# Patient Record
Sex: FEMALE | Race: OTHER | Employment: UNEMPLOYED | URBAN - METROPOLITAN AREA
[De-identification: names, ages, dates, MRNs, and addresses within clinical notes are randomized per-mention and may not be internally consistent; named-entity substitution may affect disease eponyms.]

---

## 2018-06-05 PROCEDURE — 86618 LYME DISEASE ANTIBODY: CPT | Performed by: OTHER

## 2018-06-05 PROCEDURE — 82746 ASSAY OF FOLIC ACID SERUM: CPT | Performed by: OTHER

## 2018-06-05 PROCEDURE — 82607 VITAMIN B-12: CPT | Performed by: OTHER

## 2018-06-05 PROCEDURE — 36415 COLL VENOUS BLD VENIPUNCTURE: CPT | Performed by: OTHER

## 2018-06-05 PROCEDURE — 82164 ANGIOTENSIN I ENZYME TEST: CPT | Performed by: OTHER

## 2018-06-08 PROBLEM — M51.26 L4-L5 DISC BULGE: Status: ACTIVE | Noted: 2018-06-08

## 2018-06-08 PROBLEM — M54.16 LUMBAR RADICULOPATHY: Status: ACTIVE | Noted: 2018-06-08

## 2018-06-08 PROBLEM — R20.0 LEG NUMBNESS: Status: ACTIVE | Noted: 2018-06-08

## 2018-06-18 NOTE — IMAGING NOTE
ATTEMPTED TO PHONE PT PRE LUMBAR PUNCTURE, AND REVIEW MEDS AND ALLERGIES. OF CONCERN, PT MED LIST INCLUDES IBUPROFEN (SHOULD BE OFF MED X 5 DAYS AND THE LP IS SCHEDULED FOR 06/22. ALSO OF CONCERN, PT IS SCHEDULED FOR LATE IN DAY PROCEDURE).  AWAITING CALL B

## 2018-06-19 NOTE — IMAGING NOTE
SPOKE WITH PATIENT AND DISCUSSED PREP FOR PROCEDURE.  CHANGE ARRIVAL TIME TO 1345 RATHER THEN 1430 PER RN

## 2018-06-20 ENCOUNTER — HOSPITAL ENCOUNTER (OUTPATIENT)
Dept: GENERAL RADIOLOGY | Facility: HOSPITAL | Age: 48
Discharge: HOME | End: 2018-06-20
Attending: Other
Payer: COMMERCIAL

## 2018-06-20 VITALS — DIASTOLIC BLOOD PRESSURE: 65 MMHG | HEART RATE: 65 BPM | SYSTOLIC BLOOD PRESSURE: 108 MMHG | RESPIRATION RATE: 16 BRPM

## 2018-06-20 DIAGNOSIS — R20.0 NUMBNESS AND TINGLING: ICD-10-CM

## 2018-06-20 DIAGNOSIS — R20.2 NUMBNESS AND TINGLING: ICD-10-CM

## 2018-06-20 DIAGNOSIS — R90.89 ABNORMAL FINDING ON MRI OF BRAIN: ICD-10-CM

## 2018-06-20 PROCEDURE — 86617 LYME DISEASE ANTIBODY: CPT

## 2018-06-20 PROCEDURE — 62270 DX LMBR SPI PNXR: CPT | Performed by: OTHER

## 2018-06-20 PROCEDURE — 84157 ASSAY OF PROTEIN OTHER: CPT

## 2018-06-20 PROCEDURE — 82784 ASSAY IGA/IGD/IGG/IGM EACH: CPT

## 2018-06-20 PROCEDURE — 82040 ASSAY OF SERUM ALBUMIN: CPT

## 2018-06-20 PROCEDURE — 36415 COLL VENOUS BLD VENIPUNCTURE: CPT

## 2018-06-20 PROCEDURE — 77003 FLUOROGUIDE FOR SPINE INJECT: CPT | Performed by: OTHER

## 2018-06-20 PROCEDURE — 89051 BODY FLUID CELL COUNT: CPT

## 2018-06-20 PROCEDURE — 87798 DETECT AGENT NOS DNA AMP: CPT

## 2018-06-20 PROCEDURE — 84165 PROTEIN E-PHORESIS SERUM: CPT

## 2018-06-20 PROCEDURE — 89050 BODY FLUID CELL COUNT: CPT

## 2018-06-20 PROCEDURE — 86592 SYPHILIS TEST NON-TREP QUAL: CPT

## 2018-06-20 PROCEDURE — 82164 ANGIOTENSIN I ENZYME TEST: CPT

## 2018-06-20 PROCEDURE — 82042 OTHER SOURCE ALBUMIN QUAN EA: CPT

## 2018-06-20 PROCEDURE — 83916 OLIGOCLONAL BANDS: CPT

## 2018-06-20 PROCEDURE — 82945 GLUCOSE OTHER FLUID: CPT

## 2018-06-20 RX ORDER — LIDOCAINE HYDROCHLORIDE 10 MG/ML
5 INJECTION, SOLUTION EPIDURAL; INFILTRATION; INTRACAUDAL; PERINEURAL ONCE
Status: COMPLETED | OUTPATIENT
Start: 2018-06-20 | End: 2018-06-20

## 2018-06-20 RX ORDER — LIDOCAINE HYDROCHLORIDE 10 MG/ML
INJECTION, SOLUTION EPIDURAL; INFILTRATION; INTRACAUDAL; PERINEURAL
Status: COMPLETED
Start: 2018-06-20 | End: 2018-06-20

## 2018-06-20 RX ADMIN — LIDOCAINE HYDROCHLORIDE 5 ML: 10 INJECTION, SOLUTION EPIDURAL; INFILTRATION; INTRACAUDAL; PERINEURAL at 15:59:00

## 2018-06-20 RX ADMIN — LIDOCAINE HYDROCHLORIDE 5 ML: 10 INJECTION, SOLUTION EPIDURAL; INFILTRATION; INTRACAUDAL; PERINEURAL at 14:15:00

## 2018-06-20 NOTE — IMAGING NOTE
1420:NURSING ASSESSMENT PRIOR TO LP. VITAL STABLE SEE FLOW SHEET. BP 98/60M ( PT STATES SHE TYPICALLY RUNS A LOW END BP) HR 76BPM.  REVIEWED MEDS AND ALLERGIES.  PT STATES HER SYMPTOMS FOR LP HAVE BEEN TINGLING THEN NUMBNESS IN THE AIDEE-RECTAL AREA, LEGS AN

## 2018-06-20 NOTE — IMAGING NOTE
1520 vss bp 112/66 [id number] returned to [facility] labs drawn vss pt given apple juice and cookies denies headache at present time resting quietly   at present time    1550 bp 112/76 p 78 post instructions given verbal ey written pt and spouse flor

## 2018-06-21 NOTE — PROGRESS NOTES
Initial CSF tests are normal.  I am still waiting on the results of several other tests (including Oligoclonal bands which would be the test to help in the diagnosis of MS).

## 2018-06-24 NOTE — PROGRESS NOTES
West Nile Virus, Neurosyphilis tests, and IgG/Albumin ratio are normal.  I am waiting on the results of the Oligoclonal bands.

## 2018-06-27 NOTE — PROGRESS NOTES
I spoke to the patient and reviewed her CSF results. Recommend evaluation at [facility].

## 2019-09-19 PROCEDURE — 88175 CYTOPATH C/V AUTO FLUID REDO: CPT | Performed by: FAMILY MEDICINE

## 2021-12-01 PROBLEM — S83.412A SPRAIN OF MEDIAL COLLATERAL LIGAMENT OF LEFT KNEE: Status: ACTIVE | Noted: 2021-12-01

## (undated) NOTE — LETTER
June 29, 2018          Mahi  [id number]          Dear Carmen Richards:    Normal CSF ACE and Lyme. The following are the results of your recent tests ordered by [address].   Please review the list of test results Result Value Ref Range   Immunoglobulin G 1,051 694-1,618 mg/dL   -PROTEIN, TOTAL, CSF   Result Value Ref Range   Total Protein CSF 17 15 - 45 mg/dL   -PROTEIN ELECTROPHORESIS (CSF)   Result Value Ref Range   CSF IgG Synthesis Rate -5.0 -100.0 - 3.7   CSF

## (undated) NOTE — LETTER
[address] [place] [address]  Authorization for Invasive Procedures  1.  I hereby authorize Dr. Gurpreet Ball , my physician and whomever may be designated as the doctor's assistant, to perform the following operation and/or procedure:  Annamarie Levy performed for the purposes of advancing medicine, science, and/or education, provided my identity is not revealed. If the procedure has been videotaped, the physician/surgeon will obtain the original videotape.  The hospital will not be responsible for stor My signature below affirms that prior to the time of the procedure, I have explained to the patient and/or her legal representative, the risks and benefits involved in the proposed treatment and any reasonable alternative to the proposed treatment.  I have